# Patient Record
Sex: MALE | NOT HISPANIC OR LATINO | URBAN - METROPOLITAN AREA
[De-identification: names, ages, dates, MRNs, and addresses within clinical notes are randomized per-mention and may not be internally consistent; named-entity substitution may affect disease eponyms.]

---

## 2017-07-24 ENCOUNTER — ALLSCRIPTS OFFICE VISIT (OUTPATIENT)
Dept: OTHER | Facility: OTHER | Age: 52
End: 2017-07-24

## 2018-01-10 NOTE — MISCELLANEOUS
Provider Comments  Provider Comments:   LISANDRO DID NOT CALL THE OFFICE TO CANCEL OR RESCHEDULE HIS APPOINTMENT  HE IS A NO SHOW TODAY        Signatures   Electronically signed by : Dawson Zhu DO; Jul 24 2017 12:09PM EST                       (Author)